# Patient Record
Sex: MALE | Race: OTHER | NOT HISPANIC OR LATINO | URBAN - METROPOLITAN AREA
[De-identification: names, ages, dates, MRNs, and addresses within clinical notes are randomized per-mention and may not be internally consistent; named-entity substitution may affect disease eponyms.]

---

## 2018-10-21 ENCOUNTER — EMERGENCY (EMERGENCY)
Facility: HOSPITAL | Age: 43
LOS: 1 days | Discharge: HOME | End: 2018-10-21
Admitting: EMERGENCY MEDICINE
Payer: COMMERCIAL

## 2018-10-21 VITALS
OXYGEN SATURATION: 96 % | WEIGHT: 259.93 LBS | DIASTOLIC BLOOD PRESSURE: 89 MMHG | HEART RATE: 94 BPM | RESPIRATION RATE: 18 BRPM | HEIGHT: 72 IN | SYSTOLIC BLOOD PRESSURE: 145 MMHG | TEMPERATURE: 98 F

## 2018-10-21 PROCEDURE — 99283 EMERGENCY DEPT VISIT LOW MDM: CPT | Mod: 25

## 2018-10-21 PROCEDURE — 99283 EMERGENCY DEPT VISIT LOW MDM: CPT

## 2018-10-21 RX ORDER — CEPHALEXIN 500 MG
1 CAPSULE ORAL
Qty: 20 | Refills: 0 | OUTPATIENT
Start: 2018-10-21 | End: 2018-10-30

## 2018-10-21 RX ORDER — AZTREONAM 2 G
1 VIAL (EA) INJECTION
Qty: 20 | Refills: 0 | OUTPATIENT
Start: 2018-10-21 | End: 2018-10-30

## 2018-10-21 NOTE — ED PROVIDER NOTE - OBJECTIVE STATEMENT
pt to ed co pain and red to skin after cut with staple on wall 1 week ago now swelling and red no streaks marked induration no pus no dc no fever no dizzy no headache no chills no NVD no chest pain no SOB no shakes no aches no other  injury no other complaints 8/10 no radiation no alleviating factors onset sudden sharp pain

## 2018-10-21 NOTE — ED ADULT NURSE NOTE - OBJECTIVE STATEMENT
cleaning the [employer] when got stuck by the staple in the AC L, redness on the area but localized  denies any Med Hx nor allergies to food or med

## 2018-10-21 NOTE — ED PROVIDER NOTE - MEDICAL DECISION MAKING DETAILS
pt to ed with infection frominjury 1 week ago concern for abscess not ready for I&D yet and proximity to joint low suspect that joint is involved will DC with marked area PO abs warm compress will DC and wound check 2 days I have discussed the discharge plan with the patient. The patient agrees with the plan, as discussed.  The patient understands Emergency Department diagnosis is a preliminary diagnosis often based on limited information and that the patient must adhere to the follow-up plan as discussed.  The patient understands that if the symptoms worsen or if prescribed medications do not have the desired/planned effect that the patient may return to the Emergency Department at any time for further evaluation and treatment.

## 2018-10-25 DIAGNOSIS — L03.114 CELLULITIS OF LEFT UPPER LIMB: ICD-10-CM

## 2018-10-25 DIAGNOSIS — M79.602 PAIN IN LEFT ARM: ICD-10-CM

## 2019-01-09 ENCOUNTER — EMERGENCY (EMERGENCY)
Facility: HOSPITAL | Age: 44
LOS: 1 days | Discharge: HOME | End: 2019-01-09
Admitting: EMERGENCY MEDICINE
Payer: MEDICAID

## 2019-01-09 VITALS
HEART RATE: 75 BPM | OXYGEN SATURATION: 98 % | SYSTOLIC BLOOD PRESSURE: 133 MMHG | TEMPERATURE: 98 F | DIASTOLIC BLOOD PRESSURE: 73 MMHG | RESPIRATION RATE: 20 BRPM

## 2019-01-09 VITALS
DIASTOLIC BLOOD PRESSURE: 106 MMHG | TEMPERATURE: 98 F | HEART RATE: 84 BPM | RESPIRATION RATE: 18 BRPM | WEIGHT: 315 LBS | OXYGEN SATURATION: 97 % | SYSTOLIC BLOOD PRESSURE: 162 MMHG

## 2019-01-09 LAB
ALBUMIN SERPL ELPH-MCNC: 3.5 G/DL — SIGNIFICANT CHANGE UP (ref 3.4–5)
ALP SERPL-CCNC: 81 U/L — SIGNIFICANT CHANGE UP (ref 40–120)
ALT FLD-CCNC: 37 U/L — SIGNIFICANT CHANGE UP (ref 12–42)
ANION GAP SERPL CALC-SCNC: 7 MMOL/L — LOW (ref 9–16)
AST SERPL-CCNC: 36 U/L — SIGNIFICANT CHANGE UP (ref 15–37)
BASOPHILS NFR BLD AUTO: 0.7 % — SIGNIFICANT CHANGE UP (ref 0–2)
BILIRUB SERPL-MCNC: 0.4 MG/DL — SIGNIFICANT CHANGE UP (ref 0.2–1.2)
BUN SERPL-MCNC: 10 MG/DL — SIGNIFICANT CHANGE UP (ref 7–23)
CALCIUM SERPL-MCNC: 9 MG/DL — SIGNIFICANT CHANGE UP (ref 8.5–10.5)
CHLORIDE SERPL-SCNC: 101 MMOL/L — SIGNIFICANT CHANGE UP (ref 96–108)
CK SERPL-CCNC: 758 U/L — HIGH (ref 39–308)
CO2 SERPL-SCNC: 30 MMOL/L — SIGNIFICANT CHANGE UP (ref 22–31)
CREAT SERPL-MCNC: 1.02 MG/DL — SIGNIFICANT CHANGE UP (ref 0.5–1.3)
EOSINOPHIL NFR BLD AUTO: 3.1 % — SIGNIFICANT CHANGE UP (ref 0–6)
GLUCOSE SERPL-MCNC: 97 MG/DL — SIGNIFICANT CHANGE UP (ref 70–99)
HCT VFR BLD CALC: 41.7 % — SIGNIFICANT CHANGE UP (ref 39–50)
HGB BLD-MCNC: 13.2 G/DL — SIGNIFICANT CHANGE UP (ref 13–17)
IMM GRANULOCYTES NFR BLD AUTO: 0.1 % — SIGNIFICANT CHANGE UP (ref 0–1.5)
LACTATE SERPL-SCNC: 2.3 MMOL/L — HIGH (ref 0.4–2)
LYMPHOCYTES # BLD AUTO: 37.1 % — SIGNIFICANT CHANGE UP (ref 13–44)
MAGNESIUM SERPL-MCNC: 1.8 MG/DL — SIGNIFICANT CHANGE UP (ref 1.6–2.6)
MCHC RBC-ENTMCNC: 26.3 PG — LOW (ref 27–34)
MCHC RBC-ENTMCNC: 31.7 G/DL — LOW (ref 32–36)
MCV RBC AUTO: 83.1 FL — SIGNIFICANT CHANGE UP (ref 80–100)
MONOCYTES NFR BLD AUTO: 10.3 % — SIGNIFICANT CHANGE UP (ref 2–14)
NEUTROPHILS NFR BLD AUTO: 48.7 % — SIGNIFICANT CHANGE UP (ref 43–77)
PLATELET # BLD AUTO: 193 K/UL — SIGNIFICANT CHANGE UP (ref 150–400)
POTASSIUM SERPL-MCNC: 3.7 MMOL/L — SIGNIFICANT CHANGE UP (ref 3.5–5.3)
POTASSIUM SERPL-SCNC: 3.7 MMOL/L — SIGNIFICANT CHANGE UP (ref 3.5–5.3)
PROT SERPL-MCNC: 8.6 G/DL — HIGH (ref 6.4–8.2)
RBC # BLD: 5.02 M/UL — SIGNIFICANT CHANGE UP (ref 4.2–5.8)
RBC # FLD: 15.8 % — HIGH (ref 10.3–14.5)
SODIUM SERPL-SCNC: 138 MMOL/L — SIGNIFICANT CHANGE UP (ref 132–145)
WBC # BLD: 6.7 K/UL — SIGNIFICANT CHANGE UP (ref 3.8–10.5)
WBC # FLD AUTO: 6.7 K/UL — SIGNIFICANT CHANGE UP (ref 3.8–10.5)

## 2019-01-09 PROCEDURE — 73090 X-RAY EXAM OF FOREARM: CPT | Mod: 26,RT

## 2019-01-09 PROCEDURE — 99284 EMERGENCY DEPT VISIT MOD MDM: CPT | Mod: 25

## 2019-01-09 RX ORDER — VANCOMYCIN HCL 1 G
2000 VIAL (EA) INTRAVENOUS ONCE
Qty: 0 | Refills: 0 | Status: COMPLETED | OUTPATIENT
Start: 2019-01-09 | End: 2019-01-09

## 2019-01-09 RX ORDER — SODIUM CHLORIDE 9 MG/ML
1000 INJECTION INTRAMUSCULAR; INTRAVENOUS; SUBCUTANEOUS ONCE
Qty: 0 | Refills: 0 | Status: COMPLETED | OUTPATIENT
Start: 2019-01-09 | End: 2019-01-09

## 2019-01-09 RX ORDER — IBUPROFEN 200 MG
1 TABLET ORAL
Qty: 28 | Refills: 0 | OUTPATIENT
Start: 2019-01-09 | End: 2019-01-15

## 2019-01-09 RX ORDER — CEPHALEXIN 500 MG
1 CAPSULE ORAL
Qty: 20 | Refills: 0 | OUTPATIENT
Start: 2019-01-09 | End: 2019-01-18

## 2019-01-09 RX ORDER — AZTREONAM 2 G
1 VIAL (EA) INJECTION
Qty: 20 | Refills: 0 | OUTPATIENT
Start: 2019-01-09 | End: 2019-01-18

## 2019-01-09 RX ORDER — DEXAMETHASONE 0.5 MG/5ML
10 ELIXIR ORAL ONCE
Qty: 0 | Refills: 0 | Status: COMPLETED | OUTPATIENT
Start: 2019-01-09 | End: 2019-01-09

## 2019-01-09 RX ORDER — MORPHINE SULFATE 50 MG/1
4 CAPSULE, EXTENDED RELEASE ORAL ONCE
Qty: 0 | Refills: 0 | Status: DISCONTINUED | OUTPATIENT
Start: 2019-01-09 | End: 2019-01-09

## 2019-01-09 RX ORDER — KETOROLAC TROMETHAMINE 30 MG/ML
30 SYRINGE (ML) INJECTION ONCE
Qty: 0 | Refills: 0 | Status: DISCONTINUED | OUTPATIENT
Start: 2019-01-09 | End: 2019-01-09

## 2019-01-09 RX ADMIN — Medication 250 MILLIGRAM(S): at 11:42

## 2019-01-09 RX ADMIN — Medication 30 MILLIGRAM(S): at 11:18

## 2019-01-09 RX ADMIN — MORPHINE SULFATE 4 MILLIGRAM(S): 50 CAPSULE, EXTENDED RELEASE ORAL at 11:18

## 2019-01-09 RX ADMIN — SODIUM CHLORIDE 1000 MILLILITER(S): 9 INJECTION INTRAMUSCULAR; INTRAVENOUS; SUBCUTANEOUS at 11:42

## 2019-01-09 RX ADMIN — Medication 10 MILLIGRAM(S): at 11:18

## 2019-01-09 RX ADMIN — SODIUM CHLORIDE 2000 MILLILITER(S): 9 INJECTION INTRAMUSCULAR; INTRAVENOUS; SUBCUTANEOUS at 11:18

## 2019-01-09 NOTE — ED PROVIDER NOTE - MEDICAL DECISION MAKING DETAILS
Patient with insect bite, likely inflammatory versus early cellulitis, with pronounced inflammatory response. Will give steroids, abx, and pain meds, and obtain labs. Hand Dr. Cid consulted and advised observation of patient in the ED at this time. No concern for compartment syndrome, as patient has full ROM and is neurovascularly intact.

## 2019-01-09 NOTE — ED ADULT NURSE NOTE - NSFALLRSKASSESSDT_ED_ALL_ED
09-Jan-2019 11:46
I have personally performed a face to face diagnostic evaluation on this patient. I have reviewed the PA note and agree with the history, exam, and plan of care, except as noted.

## 2019-01-09 NOTE — ED PROVIDER NOTE - NSFOLLOWUPINSTRUCTIONS_ED_ALL_ED_FT
keep arm elevated above the level of your heart as much as possible.  take motrin as needed for pain.  take all antibiotics as directed and finish.  also make sure to take steroids as directed.  Return with any worsening symptoms.  follow up with dr ramírez in 1 week.

## 2019-01-09 NOTE — ED ADULT NURSE NOTE - OBJECTIVE STATEMENT
Pt presents to ED c/o right hand redness with swelling over the past couple of days. Pt reports possible bug bite to the right wrist, circular swelling noted. Pt reports hx of abscess. Denies any fever/chills, no N/V/D, no fever/chills, no recent travel.

## 2019-01-09 NOTE — ED PROVIDER NOTE - OBJECTIVE STATEMENT
44 y/o male with PMHx of HTN presents to ED c/o right hand redness. Patient reports he noticed a bug bite to his right hand yesterday morning, and reports right hand redness, itching, pain, and swelling, as well as right forearm swelling. Denies any paresthesias to the hand, fever, or chills. States he had abscesses in the past, with his last abscess 1.5 years ago, and was given abx, including Bactrim, for his symptoms. Denies allergies to any meds. 44 y/o male with PMHx of HTN presents to ED c/o right hand redness. Patient reports he noticed a bug bite to his right hand yesterday morning, and reports right hand redness, itching, pain, and swelling, as well as right forearm swelling. Denies any paresthesias to the hand, fever, chills, or IV drug use. States he had abscesses in the past, with his last abscess 1.5 years ago, which had to be drained, and was given Bactrim and Keflex for his symptoms. Of note, patient reports he has had similar reactions in the past to bug bites, for which he had to use topical steroids to treat. Denies allergies to any meds.

## 2019-01-09 NOTE — ED PROVIDER NOTE - CARE PROVIDER_API CALL
Judith Cid), Orthopaedic Surgery  [address]  [place] [address]  Phone: [phone]  Fax: [phone]

## 2019-01-09 NOTE — ED PROVIDER NOTE - DIAGNOSTIC INTERPRETATION
Interpreted by ED physician  Right forearm x-ray, 2 views  No fracture, no dislocation (joint spaces grossly normal), no Foreign Body noted, soft tissue normal

## 2019-01-09 NOTE — ED PROVIDER NOTE - SKIN, MLM
Red indurated circular area to the right lateral dorsal wrist with slight erythema extending from the area with edema noted to the hand and forearm, no proximal streaking, no pallor.

## 2019-01-09 NOTE — ED ADULT NURSE NOTE - CHPI ED NUR SYMPTOMS NEG
no body aches/no decreased eating/drinking/no fever/no scaly patches on skin/no chills/no vomiting/no confusion

## 2019-01-09 NOTE — ED PROVIDER NOTE - PROGRESS NOTE DETAILS
Patient with marked decreased in swelling, reports pain has improved as well. Exam is still neurovascularly intact and with soft compartments.

## 2019-01-13 DIAGNOSIS — Z79.2 LONG TERM (CURRENT) USE OF ANTIBIOTICS: ICD-10-CM

## 2019-01-13 DIAGNOSIS — L53.9 ERYTHEMATOUS CONDITION, UNSPECIFIED: ICD-10-CM
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
## 2019-01-13 DIAGNOSIS — S60.561A INSECT BITE (NONVENOMOUS) OF RIGHT HAND, INITIAL ENCOUNTER: ICD-10-CM

## 2019-01-13 DIAGNOSIS — L03.113 CELLULITIS OF RIGHT UPPER LIMB: ICD-10-CM

## 2019-01-13 DIAGNOSIS — I10 ESSENTIAL (PRIMARY) HYPERTENSION: ICD-10-CM

## 2019-02-23 ENCOUNTER — EMERGENCY (EMERGENCY)
Facility: HOSPITAL | Age: 44
LOS: 1 days | Discharge: HOME | End: 2019-02-23
Attending: EMERGENCY MEDICINE | Admitting: EMERGENCY MEDICINE
Payer: COMMERCIAL

## 2019-02-23 VITALS
RESPIRATION RATE: 18 BRPM | SYSTOLIC BLOOD PRESSURE: 148 MMHG | HEART RATE: 97 BPM | DIASTOLIC BLOOD PRESSURE: 89 MMHG | TEMPERATURE: 99 F | OXYGEN SATURATION: 100 %

## 2019-02-23 VITALS
WEIGHT: 315 LBS | HEART RATE: 104 BPM | OXYGEN SATURATION: 97 % | DIASTOLIC BLOOD PRESSURE: 104 MMHG | RESPIRATION RATE: 20 BRPM | TEMPERATURE: 99 F | SYSTOLIC BLOOD PRESSURE: 150 MMHG

## 2019-02-23 PROBLEM — I10 ESSENTIAL (PRIMARY) HYPERTENSION: Chronic | Status: ACTIVE | Noted: 2019-01-09

## 2019-02-23 PROCEDURE — 99283 EMERGENCY DEPT VISIT LOW MDM: CPT

## 2019-02-23 PROCEDURE — 99284 EMERGENCY DEPT VISIT MOD MDM: CPT

## 2019-02-23 RX ORDER — IBUPROFEN 200 MG
1 TABLET ORAL
Qty: 30 | Refills: 0 | OUTPATIENT
Start: 2019-02-23

## 2019-02-23 RX ORDER — DIPHENHYDRAMINE HCL 50 MG
25 CAPSULE ORAL ONCE
Qty: 0 | Refills: 0 | Status: COMPLETED | OUTPATIENT
Start: 2019-02-23 | End: 2019-02-23

## 2019-02-23 RX ORDER — CEPHALEXIN 500 MG
1 CAPSULE ORAL
Qty: 14 | Refills: 0 | OUTPATIENT
Start: 2019-02-23 | End: 2019-03-01

## 2019-02-23 RX ORDER — CEPHALEXIN 500 MG
500 CAPSULE ORAL ONCE
Qty: 0 | Refills: 0 | Status: COMPLETED | OUTPATIENT
Start: 2019-02-23 | End: 2019-02-23

## 2019-02-23 RX ORDER — IBUPROFEN 200 MG
600 TABLET ORAL ONCE
Qty: 0 | Refills: 0 | Status: COMPLETED | OUTPATIENT
Start: 2019-02-23 | End: 2019-02-23

## 2019-02-23 RX ORDER — AZTREONAM 2 G
1 VIAL (EA) INJECTION
Qty: 14 | Refills: 0 | OUTPATIENT
Start: 2019-02-23 | End: 2019-03-01

## 2019-02-23 RX ORDER — DIPHENHYDRAMINE HCL 50 MG
1 CAPSULE ORAL
Qty: 16 | Refills: 0 | OUTPATIENT
Start: 2019-02-23 | End: 2019-02-26

## 2019-02-23 RX ADMIN — Medication 500 MILLIGRAM(S): at 14:57

## 2019-02-23 RX ADMIN — Medication 1 TABLET(S): at 14:57

## 2019-02-23 RX ADMIN — Medication 25 MILLIGRAM(S): at 14:57

## 2019-02-23 RX ADMIN — Medication 600 MILLIGRAM(S): at 14:57

## 2019-02-23 NOTE — ED ADULT NURSE NOTE - OBJECTIVE STATEMENT
Patient c/o right inner elbow pain x 1 day. Inner elbow swollen, red and inflamed. Patient also presents hypertensive. he denies chest pain, shortness of breath, dizziness, and blurry vision. Patient Patient c/o right inner elbow pain x 1 day. Inner elbow swollen, red and inflamed. Patient also presents hypertensive. he denies chest pain, shortness of breath, dizziness, and blurry vision. Patient states he hasn't taken his blood pressure medication in 2 days because "I forget some times." Patient states he will take his medication when he gets home. He currently forgot the name of the medication.

## 2019-02-23 NOTE — ED ADULT NURSE NOTE - NSSISCREENINGQ2_ED_A_ED
Telephone Encounter by Debi Santos at 01/05/18 09:30 AM     Author:  Debi Santos Service:  (none) Author Type:       Filed:  01/05/18 09:30 AM Encounter Date:  2/27/2017 Status:  Signed     :  Debi Santos ()            Patient has appt for 1/9/2018.[SR1.1M]      Revision History        User Key Date/Time User Provider Type Action    > SR1.1 01/05/18 09:30 AM Debi Santos  Sign    M - Manual             No

## 2019-02-23 NOTE — ED ADULT NURSE NOTE - NSIMPLEMENTINTERV_GEN_ALL_ED
Implemented All Universal Safety Interventions:  [place] to call system. Call bell, personal items and telephone within reach. Instruct patient to call for assistance. Room bathroom lighting operational. Non-slip footwear when patient is off stretcher. Physically safe environment: no spills, clutter or unnecessary equipment. Stretcher in lowest position, wheels locked, appropriate side rails in place.

## 2019-02-23 NOTE — ED PROVIDER NOTE - SKIN, MLM
Skin normal color for race, warm, dry and intact. right arm with 2x3 cm macular area of redness/warmth just proximal to elbow, non blanching.  no fluctuance.  no lymphatic streaking.

## 2019-02-23 NOTE — ED PROVIDER NOTE - OBJECTIVE STATEMENT
here with area of redness/swelling of right arm just above elbow since this am.  Says he is prone to cellulitis and has had similar before in different parts of body.  No fever/chills, trauma.  Smokes marijuana but denies ivda.  Says he usually gets antibiotics and it goes away.  Mild pain in area.

## 2019-02-23 NOTE — ED PROVIDER NOTE - NSFOLLOWUPINSTRUCTIONS_ED_ALL_ED_FT
Please see your primary care provider in 24-48 hours.  Return to the ER if symptoms worsen or other concerns.    Cellulitis    Cellulitis is a skin infection caused by bacteria. This condition occurs most often in the arms and lower legs but can occur anywhere over the body. Symptoms include redness, swelling, warm skin, tenderness, and chills/fever. If you were prescribed an antibiotic medicine, take it as told by your health care provider. Do not stop taking the antibiotic even if you start to feel better.    SEEK IMMEDIATE MEDICAL CARE IF YOU HAVE ANY OF THE FOLLOWING SYMPTOMS: worsening fever, red streaks coming from affected area, vomiting or diarrhea, or dizziness/lightheadedness.

## 2019-02-23 NOTE — ED ADULT NURSE NOTE - CHPI ED NUR SYMPTOMS NEG
no vomiting/no decreased eating/drinking/no tingling/no weakness/no chills/no dizziness/no fever/no nausea

## 2019-02-23 NOTE — ED PROVIDER NOTE - CLINICAL SUMMARY MEDICAL DECISION MAKING FREE TEXT BOX
suspect early cellulitis vs localized urticarial rash.  patient convinced it is recurrent cellulitis and says it is similar to prior that responds well to bactrim/ keflex.  prior visits reviewed with similar presentation with normal cbc and xray that showed soft tissue swelling.  will not repeat.  treat empirically.  return precautions discussed

## 2019-02-27 DIAGNOSIS — M79.601 PAIN IN RIGHT ARM: ICD-10-CM

## 2019-02-27 DIAGNOSIS — L03.113 CELLULITIS OF RIGHT UPPER LIMB: ICD-10-CM

## 2019-02-27 DIAGNOSIS — I10 ESSENTIAL (PRIMARY) HYPERTENSION: ICD-10-CM

## 2019-02-27 DIAGNOSIS — Z79.1 LONG TERM (CURRENT) USE OF NON-STEROIDAL ANTI-INFLAMMATORIES (NSAID): ICD-10-CM

## 2019-02-27 DIAGNOSIS — Z79.2 LONG TERM (CURRENT) USE OF ANTIBIOTICS: ICD-10-CM

## 2019-02-27 DIAGNOSIS — Z79.899 OTHER LONG TERM (CURRENT) DRUG THERAPY: ICD-10-CM

## 2019-06-25 NOTE — ED PROVIDER NOTE - NS_EDPROVIDERDISPOUSERTYPE_ED_A_ED
I have personally evaluated and examined the patient. The Attending was available to me as a supervising provider if needed. continue zyprexa 10mg po BID and lithium 900mg po qHS. Haldol 5mg PO q 4 hrs prn psychosis; Ativan 2mg PO q 4hrs prn anxiety, Diphenhydramine 50mg, PO q 4hr prn EPS, insomnia or agitaition.      Haldol 5mg IM; Ativan 2mg IM, Diphenhydramine 50mg IM once prn severe agitation (combativeness, assaultive behavior) given concern for nonadherence will restart zyprexa at 10mg po QHS and lithium at 300 mg PO BID.  Consider FRANCO given report of ?adherence.  Haldol 5mg PO q 4 hrs prn psychosis; Ativan 2mg PO q 4hrs prn anxiety, Diphenhydramine 50mg, PO q 4hr prn EPS, insomnia or agitaition.      Haldol 5mg IM; Ativan 2mg IM, Diphenhydramine 50mg IM once prn severe agitation (combativeness, assaultive behavior)

## 2020-04-03 NOTE — ED PROVIDER NOTE - NS ED MD EM SELECTION
Note:  Per current hospital medicine emergency protocol, in an effort to reduce COVID exposures and also conserve PPE for necessary encounters, H&P Above is obtained from chart review without direct patient contact unless acute changes. Pt was seen & Examine by ER, Evaluated by  renal and palliative Care today as well , I D/W the ER Nurse as well regarding the pt's status, pt in no Distress at this time on 100% NRB,     Dr. Hannah will resume the care of pt in AM.     Dr. DORY Real
[id number] Detailed

## 2020-11-21 NOTE — ED ADULT NURSE NOTE - NS ED NURSE DISCH DISPOSITION
I was present for and supervised the key/critical aspects of the procedures performed during the care of the patient. Discharged

## 2021-06-22 NOTE — ED PROVIDER NOTE - NS HIV RISK FACTOR YES
Concentration Of Solution Injected (Mg/Ml): 2.5 Include Z78.9 (Other Specified Conditions Influencing Health Status) As An Associated Diagnosis?: No Kenalog Preparation: Kenalog Treatment Number (Optional): 1 Medical Necessity Clause: This procedure was medically necessary because the lesions that were treated were: Validate Note Data When Using Inventory: Yes Lot # (Optional): [id number] Detail Level: Detailed Administered By (Optional): Elvira Marie M.D. X Size Of Lesion In Cm (Optional): 0 Total Volume Injected (Ccs- Only Use Numbers And Decimals): .6 Expiration Date (Optional): 6/2022 Declined

## 2023-02-04 NOTE — ED ADULT NURSE NOTE - NS ED NURSE LEVEL OF CONSCIOUSNESS ORIENTATION
Problem: Safety - Adult  Goal: Free from fall injury  Outcome: Progressing  Goal: Educate on transmission based isolation - droplet  Description: Educate on transmission based isolation   Outcome: Progressing     Problem: Pain  Goal: Verbalizes/displays adequate comfort level or baseline comfort level  Outcome: Progressing     Problem: Skin/Tissue Integrity  Goal: Absence of new skin breakdown  Description: 1. Monitor for areas of redness and/or skin breakdown  2. Assess vascular access sites hourly  3. Every 4-6 hours minimum:  Change oxygen saturation probe site  4. Every 4-6 hours:  If on nasal continuous positive airway pressure, respiratory therapy assess nares and determine need for appliance change or resting period.   Outcome: Progressing Oriented - self; Oriented - place; Oriented - time

## 2023-04-13 NOTE — ED PROVIDER NOTE - MUSCULOSKELETAL, MLM
Statement Selected
Full ROM of the right wrist, fingers, forearm, and elbow, 2+ distal radial pulse, no paresthesias noted.

## 2024-01-02 NOTE — ED ADULT TRIAGE NOTE - NS ED NURSE BANDS TYPE
Name band;
[de-identified] : Pt has a h/o of grafts in his nose. Pt presents today stating that has trouble breathing through his left nostril. Pt states that he was sick on Friday with a sinus infection. Pt states that sometimes he gets a bad taste in his mouth.